# Patient Record
Sex: FEMALE | Race: WHITE | NOT HISPANIC OR LATINO | Employment: OTHER | URBAN - NONMETROPOLITAN AREA
[De-identification: names, ages, dates, MRNs, and addresses within clinical notes are randomized per-mention and may not be internally consistent; named-entity substitution may affect disease eponyms.]

---

## 2018-05-31 ENCOUNTER — HISTORICAL (OUTPATIENT)
Dept: ADMINISTRATIVE | Facility: HOSPITAL | Age: 73
End: 2018-05-31

## 2022-01-24 LAB
ALBUMIN SERPL-MCNC: 4.5 G/DL (ref 3.4–5)
ALBUMIN/GLOB SERPL: 1.5 {RATIO}
ALP SERPL-CCNC: 84 U/L (ref 50–144)
ALT SERPL-CCNC: 25 U/L (ref 1–45)
ANION GAP SERPL CALC-SCNC: 5 MMOL/L (ref 2–13)
AST SERPL-CCNC: 29 U/L (ref 14–36)
BASOPHILS # BLD AUTO: 0.05 10*3/UL (ref 0.01–0.08)
BASOPHILS NFR BLD AUTO: 0.8 % (ref 0.1–1.2)
BILIRUB SERPL-MCNC: 0.51 MG/DL (ref 0–1)
BILIRUB SERPL-MCNC: NEGATIVE MG/DL
BLOOD URINE, POC: NORMAL
BUN SERPL-MCNC: 20 MG/DL (ref 7–20)
CALCIUM SERPL-MCNC: 9.9 MG/DL (ref 8.4–10.2)
CHLORIDE SERPL-SCNC: 103 MMOL/L (ref 94–110)
CLARITY, POC UA: NORMAL
CO2 SERPL-SCNC: 33 MMOL/L (ref 21–32)
COLOR, POC UA: YELLOW
CREAT SERPL-MCNC: 0.76 MG/DL (ref 0.52–1.04)
CREAT/UREA NIT SERPL: 26.3 (ref 12–20)
EOSINOPHIL # BLD AUTO: 0.15 10*3/UL (ref 0.04–0.36)
EOSINOPHIL NFR BLD AUTO: 2.4 % (ref 0.7–7)
ERYTHROCYTE [DISTWIDTH] IN BLOOD BY AUTOMATED COUNT: 13.2 % (ref 11–14.5)
GLOBULIN SER-MCNC: 3.1 G/DL (ref 2–3.9)
GLUCOSE SERPL-MCNC: 95 MGM./DL (ref 70–115)
GLUCOSE UR QL STRIP: NEGATIVE
HCT VFR BLD AUTO: 45.6 % (ref 36–48)
HGB BLD-MCNC: 14.9 G/DL (ref 11.8–16)
IMM GRANULOCYTES # BLD AUTO: 0.01 10*3/UL (ref 0–0.03)
IMM GRANULOCYTES NFR BLD AUTO: 0.2 % (ref 0–0.5)
KETONES UR QL STRIP: NEGATIVE
LEUKOCYTE EST, POC UA: NORMAL
LYMPHOCYTES # BLD AUTO: 2.17 10*3/UL (ref 1.16–3.74)
LYMPHOCYTES NFR BLD AUTO: 34.9 % (ref 20–55)
MCH RBC QN AUTO: 30.3 PG (ref 27–34)
MCHC RBC AUTO-ENTMCNC: 32.7 G/DL (ref 31–37)
MCV RBC AUTO: 92.7 FL (ref 79–99)
MONOCYTES # BLD AUTO: 0.53 10*3/UL (ref 0.24–0.36)
MONOCYTES NFR BLD AUTO: 8.5 % (ref 4.7–12.5)
NEUTROPHILS # BLD AUTO: 3.31 10*3/UL (ref 1.56–6.13)
NEUTROPHILS NFR BLD AUTO: 53.2 % (ref 37–73)
NITRITE, POC UA: NEGATIVE
PH, POC UA: 7
PLATELET # BLD AUTO: 253 10*3/UL (ref 140–371)
PMV BLD AUTO: 12.5 FL (ref 9.4–12.4)
POTASSIUM SERPL-SCNC: 3.8 MMOL/L (ref 3.5–5.1)
PROT SERPL-MCNC: 7.6 G/DL (ref 6.3–8.2)
PROTEIN, POC: NEGATIVE
RBC # BLD AUTO: 4.92 10*6/UL (ref 4–5.1)
SODIUM SERPL-SCNC: 141 MMOL/L (ref 135–145)
SPECIFIC GRAVITY, POC UA: 1.02
TSH SERPL-ACNC: 2.17 UIU/ML (ref 0.36–3.74)
UROBILINOGEN, POC UA: NORMAL
WBC # SPEC AUTO: 6.2 10*3/UL (ref 4–11.5)

## 2022-01-27 ENCOUNTER — HISTORICAL (OUTPATIENT)
Dept: ADMINISTRATIVE | Facility: HOSPITAL | Age: 77
End: 2022-01-27

## 2022-04-11 ENCOUNTER — HISTORICAL (OUTPATIENT)
Dept: ADMINISTRATIVE | Facility: HOSPITAL | Age: 77
End: 2022-04-11

## 2022-04-27 VITALS
DIASTOLIC BLOOD PRESSURE: 96 MMHG | OXYGEN SATURATION: 95 % | SYSTOLIC BLOOD PRESSURE: 160 MMHG | WEIGHT: 101.44 LBS | HEIGHT: 62 IN | BODY MASS INDEX: 18.67 KG/M2

## 2022-05-06 ENCOUNTER — HISTORICAL (OUTPATIENT)
Dept: ADMINISTRATIVE | Facility: HOSPITAL | Age: 77
End: 2022-05-06

## 2023-04-14 ENCOUNTER — OFFICE VISIT (OUTPATIENT)
Dept: FAMILY MEDICINE | Facility: CLINIC | Age: 78
End: 2023-04-14
Payer: MEDICARE

## 2023-04-14 VITALS
TEMPERATURE: 97 F | BODY MASS INDEX: 18.44 KG/M2 | SYSTOLIC BLOOD PRESSURE: 150 MMHG | WEIGHT: 100.19 LBS | OXYGEN SATURATION: 96 % | HEIGHT: 62 IN | HEART RATE: 70 BPM | DIASTOLIC BLOOD PRESSURE: 100 MMHG

## 2023-04-14 DIAGNOSIS — I10 PRIMARY HYPERTENSION: ICD-10-CM

## 2023-04-14 DIAGNOSIS — G30.9 SEVERE ALZHEIMER'S DEMENTIA WITH AGITATION, UNSPECIFIED TIMING OF DEMENTIA ONSET: Primary | ICD-10-CM

## 2023-04-14 DIAGNOSIS — F02.C11 SEVERE ALZHEIMER'S DEMENTIA WITH AGITATION, UNSPECIFIED TIMING OF DEMENTIA ONSET: Primary | ICD-10-CM

## 2023-04-14 PROBLEM — F03.90 DEMENTIA: Status: ACTIVE | Noted: 2023-04-14

## 2023-04-14 PROCEDURE — 99213 OFFICE O/P EST LOW 20 MIN: CPT | Mod: ,,, | Performed by: FAMILY MEDICINE

## 2023-04-14 PROCEDURE — 99213 PR OFFICE/OUTPT VISIT, EST, LEVL III, 20-29 MIN: ICD-10-PCS | Mod: ,,, | Performed by: FAMILY MEDICINE

## 2023-04-14 RX ORDER — MEMANTINE HYDROCHLORIDE 10 MG/1
10 TABLET ORAL 2 TIMES DAILY
COMMUNITY
Start: 2023-01-13 | End: 2023-04-14

## 2023-04-14 RX ORDER — LISINOPRIL 10 MG/1
10 TABLET ORAL DAILY
COMMUNITY
Start: 2022-05-04 | End: 2023-07-05

## 2023-04-14 RX ORDER — DONEPEZIL HYDROCHLORIDE 10 MG/1
10 TABLET, FILM COATED ORAL DAILY
COMMUNITY
Start: 2022-04-04 | End: 2023-04-14

## 2023-04-14 RX ORDER — MIRTAZAPINE 15 MG/1
7.5 TABLET, FILM COATED ORAL NIGHTLY
COMMUNITY
Start: 2023-01-13 | End: 2023-04-14

## 2023-04-14 NOTE — PROGRESS NOTES
"SUBJECTIVE:  Svitlana Bolaños is a 77 y.o. female here for follow-up    HPI  Since her last visit 3 months ago her  says that she is refusing to take any of her medications.  Her dementia is getting significantly worse.  She is still doing self-care and still eating a little bit but mostly drinks cokes all day and still smoking cigarettes all day.  She sleeps more, including in the day.  She is not up wandering at night.  Awildas allergies, medications, history, and problem list were updated as appropriate.    Review of Systems   See HPI  No results found for this or any previous visit (from the past 504 hour(s)).    OBJECTIVE:  Vital signs  Vitals:    04/14/23 1014 04/14/23 1020   BP: (!) 158/100 (!) 150/100   BP Location: Right arm Right arm   Patient Position: Sitting Sitting   BP Method: Small (Manual) Small (Manual)   Pulse: 70    Temp: 97.3 °F (36.3 °C)    TempSrc: Oral    SpO2: 96%    Weight: 45.5 kg (100 lb 3.2 oz)    Height: 5' 1.81" (1.57 m)         Physical Exam heart with a regular rate and rhythm, lungs are clear, she shuffles with her walk and  assists her with walking    ASSESSMENT/PLAN:  1. Severe Alzheimer's dementia with agitation, unspecified timing of dementia onset  It is okay to stop the memantine and donepezil as she is not wanting to take any medicines at this time.    2. Primary hypertension  I told him he can try crushing the lisinopril and put it in her drink daily         Follow Up:  Follow up in about 3 months (around 7/14/2023).            "

## 2023-07-05 ENCOUNTER — OFFICE VISIT (OUTPATIENT)
Dept: FAMILY MEDICINE | Facility: CLINIC | Age: 78
End: 2023-07-05
Payer: MEDICARE

## 2023-07-05 VITALS
DIASTOLIC BLOOD PRESSURE: 100 MMHG | OXYGEN SATURATION: 96 % | HEIGHT: 62 IN | TEMPERATURE: 99 F | WEIGHT: 103 LBS | SYSTOLIC BLOOD PRESSURE: 166 MMHG | BODY MASS INDEX: 18.95 KG/M2 | HEART RATE: 78 BPM

## 2023-07-05 DIAGNOSIS — G30.9 SEVERE ALZHEIMER'S DEMENTIA WITH AGITATION, UNSPECIFIED TIMING OF DEMENTIA ONSET: Primary | ICD-10-CM

## 2023-07-05 DIAGNOSIS — F02.C11 SEVERE ALZHEIMER'S DEMENTIA WITH AGITATION, UNSPECIFIED TIMING OF DEMENTIA ONSET: Primary | ICD-10-CM

## 2023-07-05 DIAGNOSIS — F17.200 TOBACCO DEPENDENCE: ICD-10-CM

## 2023-07-05 DIAGNOSIS — Z00.00 MEDICARE ANNUAL WELLNESS VISIT, SUBSEQUENT: ICD-10-CM

## 2023-07-05 DIAGNOSIS — I10 PRIMARY HYPERTENSION: ICD-10-CM

## 2023-07-05 PROCEDURE — 99213 OFFICE O/P EST LOW 20 MIN: CPT | Mod: ,,, | Performed by: FAMILY MEDICINE

## 2023-07-05 PROCEDURE — 99213 PR OFFICE/OUTPT VISIT, EST, LEVL III, 20-29 MIN: ICD-10-PCS | Mod: ,,, | Performed by: FAMILY MEDICINE

## 2023-07-05 PROCEDURE — G0439 PPPS, SUBSEQ VISIT: HCPCS | Mod: ,,, | Performed by: FAMILY MEDICINE

## 2023-07-05 PROCEDURE — 99406 PR TOBACCO USE CESSATION INTERMEDIATE 3-10 MINUTES: ICD-10-PCS | Mod: ,,, | Performed by: FAMILY MEDICINE

## 2023-07-05 PROCEDURE — 99406 BEHAV CHNG SMOKING 3-10 MIN: CPT | Mod: ,,, | Performed by: FAMILY MEDICINE

## 2023-07-05 PROCEDURE — G0439 PR MEDICARE ANNUAL WELLNESS SUBSEQUENT VISIT: ICD-10-PCS | Mod: ,,, | Performed by: FAMILY MEDICINE

## 2023-07-05 RX ORDER — LISINOPRIL 20 MG/1
20 TABLET ORAL DAILY
Qty: 90 TABLET | Refills: 3 | Status: SHIPPED | OUTPATIENT
Start: 2023-07-05 | End: 2023-07-05

## 2023-07-05 RX ORDER — LISINOPRIL 40 MG/1
40 TABLET ORAL DAILY
Qty: 90 TABLET | Refills: 3 | Status: SHIPPED | OUTPATIENT
Start: 2023-07-05 | End: 2024-07-04

## 2023-07-05 NOTE — PROGRESS NOTES
SUBJECTIVE:  Svitlana Bolaños is a 77 y.o. female here for Medicare AWV      HPI  Here for follow-up on chronic medical conditions as well as annual Medicare wellness.  Her dementia is becoming much worse.  She is having some agitation at times with this and refusing to take her blood pressure medicine many days.  Awildas allergies, medications, history, and problem list were updated as appropriate.    Review of Systems   Constitutional:  Negative for activity change, appetite change, fatigue and fever.   HENT:  Negative for congestion, ear pain, hearing loss, sore throat and trouble swallowing.    Eyes:  Negative for photophobia, pain, redness and visual disturbance.   Respiratory:  Negative for cough, chest tightness, shortness of breath and wheezing.    Cardiovascular:  Negative for chest pain, palpitations and leg swelling.   Gastrointestinal:  Negative for abdominal distention, abdominal pain and blood in stool.   Endocrine: Negative for cold intolerance, heat intolerance, polydipsia and polyuria.   Genitourinary:  Negative for difficulty urinating, dysuria and frequency.   Musculoskeletal:  Positive for gait problem. Negative for arthralgias, joint swelling and myalgias.   Skin:  Negative for color change, pallor and rash.   Allergic/Immunologic: Negative.    Neurological:  Negative for dizziness, seizures, speech difficulty, weakness and headaches.   Hematological:  Negative for adenopathy. Does not bruise/bleed easily.   Psychiatric/Behavioral:  Positive for agitation, confusion and sleep disturbance.     A comprehensive review of symptoms was completed and negative except as noted above.    No results found for this or any previous visit (from the past 504 hour(s)).    OBJECTIVE:  Vital signs  Vitals:    07/05/23 1031 07/05/23 1047 07/05/23 1106   BP: (!) 182/110 (!) 180/108 (!) 166/100   BP Location: Right arm Right arm Left arm   Patient Position: Sitting Sitting Sitting   BP Method: Small (Manual) Small  "(Manual)    Pulse: 78     Temp: 99 °F (37.2 °C)     TempSrc: Temporal     SpO2: 96%     Weight: 46.7 kg (103 lb)     Height: 5' 1.81" (1.57 m)          Physical Exam  Constitutional:       Appearance: Normal appearance.   HENT:      Head: Normocephalic and atraumatic.      Right Ear: External ear normal.      Left Ear: External ear normal.      Nose: Nose normal.      Mouth/Throat:      Mouth: Mucous membranes are moist.      Pharynx: Oropharynx is clear.   Eyes:      Extraocular Movements: Extraocular movements intact.      Conjunctiva/sclera: Conjunctivae normal.      Pupils: Pupils are equal, round, and reactive to light.   Cardiovascular:      Rate and Rhythm: Normal rate and regular rhythm.      Heart sounds: Normal heart sounds. No murmur heard.  Pulmonary:      Effort: Pulmonary effort is normal.      Breath sounds: Normal breath sounds. No wheezing or rhonchi.   Abdominal:      General: Abdomen is flat.      Palpations: Abdomen is soft.   Musculoskeletal:         General: Normal range of motion.      Cervical back: Normal range of motion and neck supple.   Skin:     General: Skin is warm and dry.   Neurological:      General: No focal deficit present.      Mental Status: She is alert and oriented to person, place, and time.   Psychiatric:      Comments: Flat affect, poor judgment and insight  Very poor cognition        ASSESSMENT/PLAN:  1. Severe Alzheimer's dementia with agitation, unspecified timing of dementia onset  Is having to be cared for by her  at this time.  She can no longer care for herself.  She did not want to take donepezil or memantine any more so these has been discontinued.    2. Primary hypertension  Blood pressure not as well controlled as I would like.  She is a little bit inconsistent with taking her medicine though it has been better with her son now giving it to her every day.  I would like to increase the lisinopril to 20 milligrams daily    3. Medicare annual wellness visit, " subsequent  She is not receiving any other Medicare services at this time    I offered to discuss advanced care planning,  and how to pick a person who would make decisions for you if you were unable to make them for herself, called a health care power of , and what kind of decisions you might make such as use of life-sustaining treatments such as ventilators and tube feeding when faced with a life-limiting illness recorded on a living will that they will need to know.( How to be cared for as you near the end of your natural life)    Patient is interested in learning more about how to make advance directives.  I provided them paperwork and offered to discuss this with them.   She is really getting towards the end of life with her severe dementia.  I gave her  a handout on 5 wishes today which we can discuss at her next visit.  All of the children will be home this weekend so he will also talk with them about plans for her care if something happened with him as he is the primary caregiver at this time.           Follow Up:  Follow up in about 3 months (around 10/5/2023).

## 2024-01-10 ENCOUNTER — OFFICE VISIT (OUTPATIENT)
Dept: FAMILY MEDICINE | Facility: CLINIC | Age: 79
End: 2024-01-10
Payer: MEDICARE

## 2024-01-10 VITALS
WEIGHT: 106.63 LBS | DIASTOLIC BLOOD PRESSURE: 100 MMHG | HEIGHT: 62 IN | HEART RATE: 71 BPM | TEMPERATURE: 99 F | OXYGEN SATURATION: 96 % | BODY MASS INDEX: 19.62 KG/M2 | SYSTOLIC BLOOD PRESSURE: 176 MMHG

## 2024-01-10 DIAGNOSIS — G30.9 SEVERE ALZHEIMER'S DEMENTIA WITH AGITATION, UNSPECIFIED TIMING OF DEMENTIA ONSET: Primary | ICD-10-CM

## 2024-01-10 DIAGNOSIS — F17.200 TOBACCO DEPENDENCE: ICD-10-CM

## 2024-01-10 DIAGNOSIS — I10 PRIMARY HYPERTENSION: ICD-10-CM

## 2024-01-10 DIAGNOSIS — F02.C11 SEVERE ALZHEIMER'S DEMENTIA WITH AGITATION, UNSPECIFIED TIMING OF DEMENTIA ONSET: Primary | ICD-10-CM

## 2024-01-10 PROCEDURE — 99406 BEHAV CHNG SMOKING 3-10 MIN: CPT | Mod: ,,, | Performed by: FAMILY MEDICINE

## 2024-01-10 PROCEDURE — 99213 OFFICE O/P EST LOW 20 MIN: CPT | Mod: ,,, | Performed by: FAMILY MEDICINE

## 2024-01-10 NOTE — PROGRESS NOTES
"SUBJECTIVE:  Svitlana Bolaños is a 78 y.o. female here for Follow-up (3 month)      HPI  Patient here for follow-up on dementia and hypertension.  She has been consistently taking her lisinopril according to her .  She also has been eating a little better.  She gained 3 lb over the last 3 months.  She continues to smoke about a pack per day.  Her some inches very severe and she really does not remember to do anything without prompting  Svitlana's allergies, medications, history, and problem list were updated as appropriate.    Review of Systems   See [facility]    No results found for this or any previous visit (from the past 504 hour(s)).    OBJECTIVE:  Vital signs  Vitals:    01/10/24 1036 01/10/24 1037   BP: (!) 180/98 (!) 176/100   BP Location: Right arm Left arm   Patient Position: Sitting Sitting   BP Method: Small (Manual) Small (Manual)   Pulse: 71    Temp: 99.1 °F (37.3 °C)    TempSrc: Temporal    SpO2: 96%    Weight: 48.4 kg (106 lb 9.6 oz)    Height: 5' 1.81" (1.57 m)         Physical Exam heart with a regular rate and rhythm, lungs are diminished bilateral.  Severe cognitive deficits noted    ASSESSMENT/PLAN:  1. Severe Alzheimer's dementia with agitation, unspecified timing of dementia onset  I am happy to see she is eating better.  The  does have support at home with there son that lives with them.    2. Primary hypertension  Continue lisinopril.    3. Tobacco dependence  Smoking cessation counseling for 5 minutes.  I think it is going to be difficult with this patient just because of her cognitive deficits.         Follow Up:  Follow up in about 6 months (around 7/10/2024).            "

## 2024-03-11 ENCOUNTER — CLINICAL SUPPORT (OUTPATIENT)
Dept: FAMILY MEDICINE | Facility: CLINIC | Age: 79
End: 2024-03-11
Payer: MEDICARE

## 2024-03-11 DIAGNOSIS — R30.0 DYSURIA: Primary | ICD-10-CM

## 2024-03-11 LAB
BILIRUB SERPL-MCNC: NEGATIVE MG/DL
BLOOD URINE, POC: NORMAL
CLARITY, POC UA: CLEAR
COLOR, POC UA: NORMAL
GLUCOSE UR QL STRIP: NEGATIVE
KETONES UR QL STRIP: NEGATIVE
LEUKOCYTE ESTERASE URINE, POC: NEGATIVE
NITRITE, POC UA: NEGATIVE
PH, POC UA: 5.5
PROTEIN, POC: NEGATIVE
SPECIFIC GRAVITY, POC UA: 1.02
UROBILINOGEN, POC UA: 0.2

## 2024-03-11 PROCEDURE — 81002 URINALYSIS NONAUTO W/O SCOPE: CPT | Mod: RHCUB | Performed by: FAMILY MEDICINE

## 2024-03-11 PROCEDURE — 87086 URINE CULTURE/COLONY COUNT: CPT | Performed by: FAMILY MEDICINE

## 2024-03-14 LAB — BACTERIA UR CULT: NORMAL

## 2024-04-08 ENCOUNTER — OFFICE VISIT (OUTPATIENT)
Dept: FAMILY MEDICINE | Facility: CLINIC | Age: 79
End: 2024-04-08
Payer: MEDICARE

## 2024-04-08 VITALS
TEMPERATURE: 98 F | SYSTOLIC BLOOD PRESSURE: 132 MMHG | HEART RATE: 72 BPM | DIASTOLIC BLOOD PRESSURE: 86 MMHG | OXYGEN SATURATION: 95 %

## 2024-04-08 DIAGNOSIS — G30.9 SEVERE ALZHEIMER'S DEMENTIA WITH AGITATION, UNSPECIFIED TIMING OF DEMENTIA ONSET: Primary | ICD-10-CM

## 2024-04-08 DIAGNOSIS — F02.C11 SEVERE ALZHEIMER'S DEMENTIA WITH AGITATION, UNSPECIFIED TIMING OF DEMENTIA ONSET: Primary | ICD-10-CM

## 2024-04-08 DIAGNOSIS — I70.0 AORTIC ATHEROSCLEROSIS: ICD-10-CM

## 2024-04-08 PROCEDURE — 99214 OFFICE O/P EST MOD 30 MIN: CPT | Mod: ,,, | Performed by: FAMILY MEDICINE

## 2024-04-08 NOTE — PROGRESS NOTES
SUBJECTIVE:  Svitlana Bolaños is a 78 y.o. female here for Discuss Hospice Care      HPI  Patient is here with her  to discuss extra help at home.  She has severe Alzheimer's dementia.  She is still eating but can no longer walk.  She has needing a wheelchair as well but they think they have 1 lined up through a friend.  She is refusing baths and will not get into the tub anymore.  She does not really communicate unless directly spoken to.  She has had a major decline here in the last 12 months.  She even stopped smoking because she just forgot about it.  Awildas allergies, medications, history, and problem list were updated as appropriate.    Review of Systems   See HPI    No results found for this or any previous visit (from the past 504 hour(s)).    OBJECTIVE:  Vital signs  Vitals:    04/08/24 1249   BP: 132/86   BP Location: Right arm   Patient Position: Sitting   Pulse: 72   Temp: 97.7 °F (36.5 °C)   TempSrc: Oral   SpO2: 95%        Physical Exam non ill-appearing, heart with a regular rate and rhythm, she does not really communicate nor answer questions appropriately    ASSESSMENT/PLAN:  1. Severe Alzheimer's dementia with agitation, unspecified timing of dementia onset  Patient really getting towards the end-stage Alzheimer's dementia.  They do not really want any aggressive treatment so I would recommend hospice therapy just to give some extra help at home as they are having difficulty with bathing and things like that.    2. Aortic atherosclerosis  This was seen on previous chest radiography.  As she is more end-stage of life I do not think we need to do any treatment at this time    3. Hypertension - she is actually taking her blood pressure medicine again.  She will not take it for the  but we will take it for her son who brings it to her at 2:00 a.m. every afternoon   If she is unable to get a wheelchair her friend we will send a prescription for 1 to a local [employer] company  Follow Up:  No  follow-ups on file.

## 2024-07-11 ENCOUNTER — OFFICE VISIT (OUTPATIENT)
Dept: FAMILY MEDICINE | Facility: CLINIC | Age: 79
End: 2024-07-11
Payer: MEDICARE

## 2024-07-11 DIAGNOSIS — I10 PRIMARY HYPERTENSION: Primary | ICD-10-CM

## 2024-07-11 DIAGNOSIS — G30.9 SEVERE ALZHEIMER'S DEMENTIA WITH AGITATION, UNSPECIFIED TIMING OF DEMENTIA ONSET: ICD-10-CM

## 2024-07-11 DIAGNOSIS — F02.C11 SEVERE ALZHEIMER'S DEMENTIA WITH AGITATION, UNSPECIFIED TIMING OF DEMENTIA ONSET: ICD-10-CM

## 2024-07-11 PROCEDURE — 99213 OFFICE O/P EST LOW 20 MIN: CPT | Mod: 95,,, | Performed by: FAMILY MEDICINE

## 2024-07-11 RX ORDER — LISINOPRIL 40 MG/1
40 TABLET ORAL DAILY
Qty: 90 TABLET | Refills: 3 | Status: SHIPPED | OUTPATIENT
Start: 2024-07-11 | End: 2025-07-11

## 2024-07-11 NOTE — PROGRESS NOTES
SUBJECTIVE:  Svitlana Bolaños is a 78 y.o. female here for No chief complaint on file.      HPI  This is a TeleMed visit patient has a history of severe dementia and hypertension.  Difficulty get her out of the house now.  She is doing well with no new problems to report  Awildas allergies, medications, history, and problem list were updated as appropriate.    Review of Systems   Has been says she is sleeping more now.    No results found for this or any previous visit (from the past 504 hour(s)).    OBJECTIVE:  Vital signs  There were no vitals filed for this visit.     Physical Exam TeleMed visit.  Patient was awake and alert but not too talkative today.  No signs of distress    ASSESSMENT/PLAN:  1. Primary hypertension  Continue lisinopril 40 mg daily    2. Severe Alzheimer's dementia with agitation, unspecified timing of dementia onset      Other orders  -     lisinopriL (PRINIVIL,ZESTRIL) 40 MG tablet; Take 1 tablet (40 mg total) by mouth once daily.  Dispense: 90 tablet; Refill: 3         Follow Up:  Follow up in about 6 months (around 1/11/2025) for Virtual Visit.  This is a TeleMed visit that took place between 3:00 p.m. and 3:15 p.m. at the patient's home in [facility]

## 2024-09-19 ENCOUNTER — OFFICE VISIT (OUTPATIENT)
Dept: FAMILY MEDICINE | Facility: CLINIC | Age: 79
End: 2024-09-19
Payer: MEDICARE

## 2024-09-19 DIAGNOSIS — F02.C11 SEVERE ALZHEIMER'S DEMENTIA WITH AGITATION, UNSPECIFIED TIMING OF DEMENTIA ONSET: ICD-10-CM

## 2024-09-19 DIAGNOSIS — I10 PRIMARY HYPERTENSION: Primary | ICD-10-CM

## 2024-09-19 DIAGNOSIS — G30.9 SEVERE ALZHEIMER'S DEMENTIA WITH AGITATION, UNSPECIFIED TIMING OF DEMENTIA ONSET: ICD-10-CM

## 2024-09-19 PROCEDURE — 99214 OFFICE O/P EST MOD 30 MIN: CPT | Mod: 95,,, | Performed by: FAMILY MEDICINE

## 2024-09-19 RX ORDER — AMLODIPINE BESYLATE 5 MG/1
5 TABLET ORAL DAILY
COMMUNITY

## 2024-09-19 RX ORDER — CHLORTHALIDONE 25 MG/1
25 TABLET ORAL DAILY
Qty: 30 TABLET | Refills: 11 | Status: SHIPPED | OUTPATIENT
Start: 2024-09-19 | End: 2025-09-19

## 2024-09-19 NOTE — PROGRESS NOTES
SUBJECTIVE:  Svitlana Bolaños is a 78 y.o. female here for No chief complaint on file.      HPI  Patient's family requested a TeleMed visit.  Her blood pressure has been running elevated.  Systolic pressures has been in the 170s and diastolic up to 110.  She is not complaining of anything.  She does have a history of severe dementia.  Currently on lisinopril 40 mg daily and amlodipine 5 mg daily  Awildas allergies, medications, history, and problem list were updated as appropriate.    Review of Systems   See HPI    No results found for this or any previous visit (from the past 504 hour(s)).    OBJECTIVE:  Vital signs  There were no vitals filed for this visit.     Physical Exam   This is a TeleMed visit.  Patient appears to be in no distress  ASSESSMENT/PLAN:  1. Primary hypertension  Add chlorthalidone 25 mg daily.  Continue amlodipine and lisinopril.  Call us in 1 week with blood pressure readings    2. Severe Alzheimer's dementia with agitation, unspecified timing of dementia onset      Other orders  -     chlorthalidone (HYGROTEN) 25 MG Tab; Take 1 tablet (25 mg total) by mouth once daily.  Dispense: 30 tablet; Refill: 11         Follow Up:  No follow-ups on file.

## 2025-01-14 ENCOUNTER — OFFICE VISIT (OUTPATIENT)
Dept: FAMILY MEDICINE | Facility: CLINIC | Age: 80
End: 2025-01-14
Payer: MEDICARE

## 2025-01-14 DIAGNOSIS — F02.C11 SEVERE ALZHEIMER'S DEMENTIA WITH AGITATION, UNSPECIFIED TIMING OF DEMENTIA ONSET: Primary | ICD-10-CM

## 2025-01-14 DIAGNOSIS — I10 PRIMARY HYPERTENSION: ICD-10-CM

## 2025-01-14 DIAGNOSIS — I70.0 AORTIC ATHEROSCLEROSIS: ICD-10-CM

## 2025-01-14 DIAGNOSIS — G30.9 SEVERE ALZHEIMER'S DEMENTIA WITH AGITATION, UNSPECIFIED TIMING OF DEMENTIA ONSET: Primary | ICD-10-CM

## 2025-01-14 PROCEDURE — 98004 SYNCH AUDIO-VIDEO EST SF 10: CPT | Mod: 95,,, | Performed by: FAMILY MEDICINE

## 2025-01-14 NOTE — PROGRESS NOTES
SUBJECTIVE:  Svitlana Bolaños is a 79 y.o. female here for No chief complaint on file.      HPI  Patient being seen as a TeleMed visit today.  She is currently on hospice for severe dementia.  She is doing well.  She finally quit smoking in his gained some weight.  She has been taking her blood pressure medicine regularly blood pressure readings has been doing well.  That is very difficult for the family to get her out of the house.  Svitlana's allergies, medications, history, and problem list were updated as appropriate.    Review of Systems   See HPI    No results found for this or any previous visit (from the past 3 weeks).    OBJECTIVE:  Vital signs  There were no vitals filed for this visit.     Physical Exam this is a TeleMed visit.  Patient appears to be doing well    ASSESSMENT/PLAN:  1. Severe Alzheimer's dementia with agitation, unspecified timing of dementia onset  Continue with hospice care.  Patient has severe dementia and requires complete care for her ADLs    2. Primary hypertension  Continue lisinopril chlorthalidone and amlodipine    3. Aortic atherosclerosis  She is not on statin therapy because of the patient has on hospice care so we are more concerned with quality of life then longevity         Follow Up:  Follow up in about 6 months (around 7/14/2025).  This TeleMed visit took place between 2:30 p.m. and 2:45 p.m. at the patient's home in [facility]